# Patient Record
Sex: FEMALE | Race: WHITE | NOT HISPANIC OR LATINO | Employment: OTHER
[De-identification: names, ages, dates, MRNs, and addresses within clinical notes are randomized per-mention and may not be internally consistent; named-entity substitution may affect disease eponyms.]

---

## 2019-11-03 ENCOUNTER — HEALTH MAINTENANCE LETTER (OUTPATIENT)
Age: 74
End: 2019-11-03

## 2020-02-10 ENCOUNTER — HEALTH MAINTENANCE LETTER (OUTPATIENT)
Age: 75
End: 2020-02-10

## 2020-11-16 ENCOUNTER — HEALTH MAINTENANCE LETTER (OUTPATIENT)
Age: 75
End: 2020-11-16

## 2021-04-03 ENCOUNTER — HEALTH MAINTENANCE LETTER (OUTPATIENT)
Age: 76
End: 2021-04-03

## 2021-09-18 ENCOUNTER — HEALTH MAINTENANCE LETTER (OUTPATIENT)
Age: 76
End: 2021-09-18

## 2021-11-13 ENCOUNTER — HEALTH MAINTENANCE LETTER (OUTPATIENT)
Age: 76
End: 2021-11-13

## 2022-04-30 ENCOUNTER — HEALTH MAINTENANCE LETTER (OUTPATIENT)
Age: 77
End: 2022-04-30

## 2022-11-19 ENCOUNTER — HEALTH MAINTENANCE LETTER (OUTPATIENT)
Age: 77
End: 2022-11-19

## 2023-06-01 ENCOUNTER — HEALTH MAINTENANCE LETTER (OUTPATIENT)
Age: 78
End: 2023-06-01

## 2024-07-13 ENCOUNTER — APPOINTMENT (OUTPATIENT)
Dept: CT IMAGING | Facility: CLINIC | Age: 79
End: 2024-07-13
Attending: EMERGENCY MEDICINE
Payer: MEDICARE

## 2024-07-13 ENCOUNTER — HOSPITAL ENCOUNTER (EMERGENCY)
Facility: CLINIC | Age: 79
Discharge: HOME | End: 2024-07-13
Attending: EMERGENCY MEDICINE | Admitting: EMERGENCY MEDICINE
Payer: MEDICARE

## 2024-07-13 ENCOUNTER — APPOINTMENT (OUTPATIENT)
Dept: GENERAL RADIOLOGY | Facility: CLINIC | Age: 79
End: 2024-07-13
Attending: EMERGENCY MEDICINE
Payer: MEDICARE

## 2024-07-13 VITALS
SYSTOLIC BLOOD PRESSURE: 122 MMHG | RESPIRATION RATE: 16 BRPM | OXYGEN SATURATION: 96 % | HEART RATE: 67 BPM | DIASTOLIC BLOOD PRESSURE: 75 MMHG | TEMPERATURE: 97 F

## 2024-07-13 DIAGNOSIS — R55 NEAR SYNCOPE: ICD-10-CM

## 2024-07-13 LAB
ALBUMIN SERPL BCG-MCNC: 4.2 G/DL (ref 3.5–5.2)
ALBUMIN UR-MCNC: NEGATIVE MG/DL
ALP SERPL-CCNC: 85 U/L (ref 40–150)
ALT SERPL W P-5'-P-CCNC: 22 U/L (ref 0–50)
ANION GAP SERPL CALCULATED.3IONS-SCNC: 8 MMOL/L (ref 7–15)
APPEARANCE UR: CLEAR
AST SERPL W P-5'-P-CCNC: 19 U/L (ref 0–45)
ATRIAL RATE - MUSE: 61 BPM
BASOPHILS # BLD AUTO: 0 10E3/UL (ref 0–0.2)
BASOPHILS NFR BLD AUTO: 1 %
BILIRUB SERPL-MCNC: 0.6 MG/DL
BILIRUB UR QL STRIP: NEGATIVE
BUN SERPL-MCNC: 18.8 MG/DL (ref 8–23)
CALCIUM SERPL-MCNC: 9.3 MG/DL (ref 8.8–10.2)
CHLORIDE SERPL-SCNC: 104 MMOL/L (ref 98–107)
COLOR UR AUTO: NORMAL
CREAT SERPL-MCNC: 0.82 MG/DL (ref 0.51–0.95)
DEPRECATED HCO3 PLAS-SCNC: 28 MMOL/L (ref 22–29)
DIASTOLIC BLOOD PRESSURE - MUSE: NORMAL MMHG
EGFRCR SERPLBLD CKD-EPI 2021: 73 ML/MIN/1.73M2
EOSINOPHIL # BLD AUTO: 0.1 10E3/UL (ref 0–0.7)
EOSINOPHIL NFR BLD AUTO: 1 %
ERYTHROCYTE [DISTWIDTH] IN BLOOD BY AUTOMATED COUNT: 12.1 % (ref 10–15)
GLUCOSE SERPL-MCNC: 97 MG/DL (ref 70–99)
GLUCOSE UR STRIP-MCNC: NEGATIVE MG/DL
HCT VFR BLD AUTO: 41.8 % (ref 35–47)
HGB BLD-MCNC: 14.1 G/DL (ref 11.7–15.7)
HGB UR QL STRIP: NEGATIVE
HOLD SPECIMEN: NORMAL
HOLD SPECIMEN: NORMAL
IMM GRANULOCYTES # BLD: 0 10E3/UL
IMM GRANULOCYTES NFR BLD: 0 %
INTERPRETATION ECG - MUSE: NORMAL
KETONES UR STRIP-MCNC: NEGATIVE MG/DL
LEUKOCYTE ESTERASE UR QL STRIP: NEGATIVE
LYMPHOCYTES # BLD AUTO: 3.1 10E3/UL (ref 0.8–5.3)
LYMPHOCYTES NFR BLD AUTO: 41 %
MCH RBC QN AUTO: 32.4 PG (ref 26.5–33)
MCHC RBC AUTO-ENTMCNC: 33.7 G/DL (ref 31.5–36.5)
MCV RBC AUTO: 96 FL (ref 78–100)
MONOCYTES # BLD AUTO: 0.6 10E3/UL (ref 0–1.3)
MONOCYTES NFR BLD AUTO: 8 %
NEUTROPHILS # BLD AUTO: 3.7 10E3/UL (ref 1.6–8.3)
NEUTROPHILS NFR BLD AUTO: 49 %
NITRATE UR QL: NEGATIVE
NRBC # BLD AUTO: 0 10E3/UL
NRBC BLD AUTO-RTO: 0 /100
P AXIS - MUSE: 65 DEGREES
PH UR STRIP: 6 [PH] (ref 5–7)
PLATELET # BLD AUTO: 268 10E3/UL (ref 150–450)
POTASSIUM SERPL-SCNC: 4.2 MMOL/L (ref 3.4–5.3)
PR INTERVAL - MUSE: 186 MS
PROT SERPL-MCNC: 6.7 G/DL (ref 6.4–8.3)
QRS DURATION - MUSE: 94 MS
QT - MUSE: 424 MS
QTC - MUSE: 426 MS
R AXIS - MUSE: 53 DEGREES
RBC # BLD AUTO: 4.35 10E6/UL (ref 3.8–5.2)
RBC URINE: 0 /HPF
SODIUM SERPL-SCNC: 140 MMOL/L (ref 135–145)
SP GR UR STRIP: 1 (ref 1–1.03)
SYSTOLIC BLOOD PRESSURE - MUSE: NORMAL MMHG
T AXIS - MUSE: 72 DEGREES
TROPONIN T SERPL HS-MCNC: 8 NG/L
TROPONIN T SERPL HS-MCNC: 8 NG/L
UROBILINOGEN UR STRIP-MCNC: NORMAL MG/DL
VENTRICULAR RATE- MUSE: 61 BPM
WBC # BLD AUTO: 7.6 10E3/UL (ref 4–11)
WBC URINE: 0 /HPF

## 2024-07-13 PROCEDURE — 99285 EMERGENCY DEPT VISIT HI MDM: CPT | Mod: 25

## 2024-07-13 PROCEDURE — 81001 URINALYSIS AUTO W/SCOPE: CPT | Performed by: EMERGENCY MEDICINE

## 2024-07-13 PROCEDURE — 80053 COMPREHEN METABOLIC PANEL: CPT | Performed by: EMERGENCY MEDICINE

## 2024-07-13 PROCEDURE — 93005 ELECTROCARDIOGRAM TRACING: CPT

## 2024-07-13 PROCEDURE — 71046 X-RAY EXAM CHEST 2 VIEWS: CPT

## 2024-07-13 PROCEDURE — 84484 ASSAY OF TROPONIN QUANT: CPT | Performed by: EMERGENCY MEDICINE

## 2024-07-13 PROCEDURE — 70450 CT HEAD/BRAIN W/O DYE: CPT | Mod: MA

## 2024-07-13 PROCEDURE — 36415 COLL VENOUS BLD VENIPUNCTURE: CPT | Performed by: EMERGENCY MEDICINE

## 2024-07-13 PROCEDURE — 85025 COMPLETE CBC W/AUTO DIFF WBC: CPT | Performed by: EMERGENCY MEDICINE

## 2024-07-13 ASSESSMENT — ACTIVITIES OF DAILY LIVING (ADL)
ADLS_ACUITY_SCORE: 35

## 2024-07-13 NOTE — ED PROVIDER NOTES
Emergency Department Note      History of Present Illness     Chief Complaint   Dizziness      HPI   Patricia Mello is a 78 year old female with history of migraines, hyperlipidemia, and acquired hypothyroidism who presents to the ED for dizziness. The daughter of the patient reports 2 weeks ago the patient began experiencing an episode of room spinning dizziness exacerbated worsening with movement that resolved quickly. She states 9 days ago the patient fell 9 days ago and was diagnosed with a brain bleed. She notes they did a CT and repeat which showed improvement of the brain bleed. She mentions last night the patient began experiencing an episode of worsening room spinning with movement. She reports she is experiencing photophobia. She denies blood thinner use.     Independent Historian   Daughter as detailed above.    Review of External Notes   I reviewed [facility] 7/4/24 visit for subarachnoid hemorrhage.    Past Medical History     Medical History and Problem List   Benign neoplasm of colon  Depression, major, recurrent, mild   Diverticulosis of colon (without mention of hemorrhage)  Leiomyoma of uterus, unspecified  Lumbago  Other disorders of synovium, tendon, and bursa  Sebaceous cyst  Unspecified hemorrhoids without mention of complication  Obstructive Sleep Apnea Adult   Migraine variant  Mixed hyperlipidemia  Acquired hypothyroidism  Contusion of chest wall  Avulsion fracture of tooth  Other and unspecified malignant neoplasm of skin of other and unspecified parts of face  Hypersomnia with sleep apnea  Osteopenia   Polyp Colon   Diverticulosis   Dermatochalasis Left Upper Eyelid   Dermatochalasis Right Upper Eyelid   Cataract Senile Nuclear Sclerosis Right   Cataract Senile Nuclear Sclerosis Left   Cataract Senile Cortical Left   Cataract Senile Cortical Right   Hypersomnia       Medications   Albuterol inhaler   Fluoxetine   Levothyroxine  Pravastatin  Doxycycline hyclate     Surgical History    [facility] colonoscopy w snare removal tumor/polyp/lesion  [facility]c knee scope,med/lat menisectomy   Hc arthro, loose body + chondroo   [facility] colonoscopy w biopsy   [facility] colonoscopy w snare removal tumor/polyp/lesion  Hc flex sigmoidoscopy w/wo levi spec by brush/wash  Hc exc benign skin lesion sclp/nck/hnds/feet/gen <=0.5 cm   [facility] total abdom hysterectomy   Hc exc benign skin lesion sclp/nck/hnds/feet/gen <=0.5 cm   Hc excision breast lesion, open >=1  Open reduction of fracture of radius with internal fixation   Tonsillectomy   Salpingo-oophorectomy   D and C  Extraction cataract with insertion intraocular lens x2  Blepharoplasty       Physical Exam     Patient Vitals for the past 24 hrs:   BP Temp Temp src Pulse Resp SpO2   07/13/24 1315 122/75 -- -- 67 -- 96 %   07/13/24 1300 101/74 -- -- 59 -- 99 %   07/13/24 1244 118/82 -- -- 60 -- 99 %   07/13/24 1229 (!) 136/105 -- -- 62 -- 99 %   07/13/24 1214 (!) 135/91 -- -- 60 -- 98 %   07/13/24 1109 (!) 136/92 -- -- -- -- 99 %   07/13/24 1020 (!) 144/85 97  F (36.1  C) Temporal 59 16 100 %     Physical Exam  General: Alert, No distress. Nontoxic appearance  Head: No signs of trauma.   Mouth/Throat: Oropharynx moist.   Eyes: Conjunctivae are normal. Pupils are equal..   Neck: Normal range of motion.    CV: Appears well perfused.  Resp:No respiratory distress.   MSK: Normal range of motion. No obvious deformity.   Neuro: The patient is alert and interactive. DENNEY. Speech normal. GCS 15  Skin: No lesion or sign of trauma noted. Resolving bruising of left side of face that appears to be days old   Psych: normal mood and affect. behavior is normal.       Diagnostics     Lab Results   Labs Ordered and Resulted from Time of ED Arrival to Time of ED Departure   COMPREHENSIVE METABOLIC PANEL - Normal       Result Value    Sodium 140      Potassium 4.2      Carbon Dioxide (CO2) 28      Anion Gap 8      Urea Nitrogen 18.8      Creatinine 0.82      GFR Estimate 73      Calcium 9.3       Chloride 104      Glucose 97      Alkaline Phosphatase 85      AST 19      ALT 22      Protein Total 6.7      Albumin 4.2      Bilirubin Total 0.6     TROPONIN T, HIGH SENSITIVITY - Normal    Troponin T, High Sensitivity 8     TROPONIN T, HIGH SENSITIVITY - Normal    Troponin T, High Sensitivity 8     CBC WITH PLATELETS AND DIFFERENTIAL    WBC Count 7.6      RBC Count 4.35      Hemoglobin 14.1      Hematocrit 41.8      MCV 96      MCH 32.4      MCHC 33.7      RDW 12.1      Platelet Count 268      % Neutrophils 49      % Lymphocytes 41      % Monocytes 8      % Eosinophils 1      % Basophils 1      % Immature Granulocytes 0      NRBCs per 100 WBC 0      Absolute Neutrophils 3.7      Absolute Lymphocytes 3.1      Absolute Monocytes 0.6      Absolute Eosinophils 0.1      Absolute Basophils 0.0      Absolute Immature Granulocytes 0.0      Absolute NRBCs 0.0     ROUTINE UA WITH MICROSCOPIC REFLEX TO CULTURE       Imaging   Head CT w/o contrast   Final Result   IMPRESSION:      1.  No CT evidence of acute intracranial abnormality.   2.  Brain atrophy and presumed chronic microvascular ischemic changes as above.   3.  Incompletely imaged findings suggestive of chronic right maxillary sinusitis.      XR Chest 2 Views   Final Result   IMPRESSION: No focal consolidation, pleural effusion or pneumothorax. Cardiomediastinal silhouette is unremarkable.          EKG   ECG taken at 1052, ECG read at 1054  Sinus rhythm with marked sinus arrhythmia    Otherwise normal ECG  Rate 61 bpm. NE interval 186 ms. QRS duration 94 ms. QT/QTc 424/426 ms. P-R-T axes 65 53 72.    Independent Interpretation   CT Head: No intracranial hemorrhage.  CXR shows no pneumothorax    ED Course      Medications Administered   Medications - No data to display    Procedures   Procedures     Discussion of Management   None    ED Course   ED Course as of 07/13/24 1338   Sat Jul 13, 2024   1049 I obtained history and examined the patient as noted above.    1331  I rechecked the patient and explained findings.        Optional/Additional Documentation  None    Medical Decision Making / Diagnosis     OMID Mello is a 78 year old female who presents for evaluation of near-syncope.  She did not have actual syncope.  The differential for near-syncope is broad and includes etiologies such as cardiac arrythmia, ACS, aortic stenosis, HOCM, PE, orthostatic hypotension, drugs, situational, carotid hypersensitivity, seizure, TIA, stroke, vasovagal.   I also considered ectopic pregnancy and pregnancy as causes.  There are no signs of a concerning etiology for near- syncope at this point.  The recent subarachnoid hemorrhage of her brain has cleared.  There is no family history of sudden death, no chest pain, no seizure activity or post-ictal period, no murmur, and no signs of orthostasis in the ED, no focal neurologic symptoms, and no complaints of concerning headache.  The workup in the ED is negative and the physical exam is re-assurring.  Supportive outpatient management is therefore indicated.      Disposition   The patient was discharged.     Diagnosis     ICD-10-CM    1. Near syncope  R55            Discharge Medications   New Prescriptions    No medications on file         Scribe Disclosure:  I, Nyasia Somers, am serving as a scribe at 10:46 AM on 7/13/2024 to document services personally performed by Yossi Godinez MD based on my observations and the provider's statements to me.        Yossi Godinez MD  07/13/24 [id number]

## 2024-07-13 NOTE — ED TRIAGE NOTES
Pt arrives through triage c/o dizziness this AM for a couple minutes, pt states she has been unsteady since then, pt fell on 7/4 and struck L anterior face, pt was evaluated for that injury, pt does not remember fall but thinks it may have been the dizziness which caused it, ABCD intact.       Triage Assessment (Adult)       Row Name 07/13/24 1023          Triage Assessment    Airway WDL WDL        Respiratory WDL    Respiratory WDL WDL        Skin Circulation/Temperature WDL    Skin Circulation/Temperature WDL X        Cardiac WDL    Cardiac WDL WDL        Peripheral/Neurovascular WDL    Peripheral Neurovascular WDL WDL        Cognitive/Neuro/Behavioral WDL    Cognitive/Neuro/Behavioral WDL WDL

## 2025-03-29 ENCOUNTER — HEALTH MAINTENANCE LETTER (OUTPATIENT)
Age: 80
End: 2025-03-29